# Patient Record
Sex: MALE | Race: WHITE | HISPANIC OR LATINO | Employment: FULL TIME | URBAN - NONMETROPOLITAN AREA
[De-identification: names, ages, dates, MRNs, and addresses within clinical notes are randomized per-mention and may not be internally consistent; named-entity substitution may affect disease eponyms.]

---

## 2019-08-11 ENCOUNTER — OFFICE VISIT (OUTPATIENT)
Dept: URGENT CARE | Facility: PHYSICIAN GROUP | Age: 24
End: 2019-08-11
Payer: COMMERCIAL

## 2019-08-11 VITALS — TEMPERATURE: 98.1 F | HEART RATE: 60 BPM | RESPIRATION RATE: 16 BRPM | WEIGHT: 169 LBS | OXYGEN SATURATION: 99 %

## 2019-08-11 DIAGNOSIS — H10.32 ACUTE BACTERIAL CONJUNCTIVITIS OF LEFT EYE: ICD-10-CM

## 2019-08-11 PROCEDURE — 99204 OFFICE O/P NEW MOD 45 MIN: CPT | Performed by: FAMILY MEDICINE

## 2019-08-11 RX ORDER — OFLOXACIN 3 MG/ML
SOLUTION/ DROPS OPHTHALMIC
Qty: 5 ML | Refills: 1 | Status: SHIPPED | OUTPATIENT
Start: 2019-08-11 | End: 2021-07-12

## 2019-08-11 ASSESSMENT — PAIN SCALES - GENERAL: PAINLEVEL: NO PAIN
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
## 2020-12-01 ENCOUNTER — OFFICE VISIT (OUTPATIENT)
Dept: URGENT CARE | Facility: PHYSICIAN GROUP | Age: 25
End: 2020-12-01
Payer: COMMERCIAL

## 2020-12-01 ENCOUNTER — HOSPITAL ENCOUNTER (OUTPATIENT)
Facility: MEDICAL CENTER | Age: 25
End: 2020-12-01
Attending: PHYSICIAN ASSISTANT
Payer: COMMERCIAL

## 2020-12-01 VITALS
DIASTOLIC BLOOD PRESSURE: 92 MMHG | WEIGHT: 197 LBS | SYSTOLIC BLOOD PRESSURE: 146 MMHG | RESPIRATION RATE: 12 BRPM | HEART RATE: 81 BPM | BODY MASS INDEX: 29.86 KG/M2 | HEIGHT: 68 IN | TEMPERATURE: 99.4 F | OXYGEN SATURATION: 98 %

## 2020-12-01 DIAGNOSIS — J02.9 SORE THROAT: ICD-10-CM

## 2020-12-01 LAB
COVID ORDER STATUS COVID19: NORMAL
INT CON NEG: NORMAL
INT CON POS: NORMAL
S PYO AG THROAT QL: NORMAL

## 2020-12-01 PROCEDURE — 99214 OFFICE O/P EST MOD 30 MIN: CPT | Performed by: PHYSICIAN ASSISTANT

## 2020-12-01 PROCEDURE — 87880 STREP A ASSAY W/OPTIC: CPT | Performed by: PHYSICIAN ASSISTANT

## 2020-12-01 PROCEDURE — U0003 INFECTIOUS AGENT DETECTION BY NUCLEIC ACID (DNA OR RNA); SEVERE ACUTE RESPIRATORY SYNDROME CORONAVIRUS 2 (SARS-COV-2) (CORONAVIRUS DISEASE [COVID-19]), AMPLIFIED PROBE TECHNIQUE, MAKING USE OF HIGH THROUGHPUT TECHNOLOGIES AS DESCRIBED BY CMS-2020-01-R: HCPCS

## 2020-12-01 NOTE — LETTER
December 1, 2020         Patient: Ernesto Khan   YOB: 1995   Date of Visit: 12/1/2020           To Whom it May Concern:    Ernesto Khan was seen in my clinic on 12/1/2020.    Please excuse patient for missing school/work until COVID results are available, typically taking 1-3 days.  They have been advised to quarantine during this time.      If you have any questions or concerns, please don't hesitate to call.        Sincerely,           Mary Ann Marte P.A.-C.  Electronically Signed

## 2020-12-01 NOTE — PROGRESS NOTES
Chief Complaint   Patient presents with   • Sore Throat     x2days    • Nasal Congestion       HISTORY OF PRESENT ILLNESS: Patient is a 25 y.o. female who presents today for the following:    ST x 2-3 days  + nasal congestion, mild HA, mild cough  Denies  SOB, fever, chills, body aches  No known COVID exposure  OTC meds today: none    There are no active problems to display for this patient.      Allergies:Pcn [penicillins]    Current Outpatient Medications Ordered in Epic   Medication Sig Dispense Refill   • ofloxacin (OCUFLOX) 0.3 % Solution 1-2 DROPS TO LEFT EYE EVERY 2-4 HOURS WHILE AWAKE. USE UNTIL BETTER AND FOR ONE EXTRA DAY AFTER BETTER. (Patient not taking: Reported on 12/1/2020) 5 mL 1     No current [facility]-ordered facility-administered medications on file.        History reviewed. No pertinent past medical history.    Social History     Tobacco Use   • Smoking status: Never Smoker   • Smokeless tobacco: Never Used   Substance Use Topics   • Alcohol use: Not on file   • Drug use: Not on file       No family status information on file.   History reviewed. No pertinent family history.    Review of Systems:   Constitutional ROS: No unexpected change in weight, No weakness, No fatigue  Eye ROS: No recent significant change in vision, No eye pain, redness, discharge  Ear ROS: No drainage, No tinnitus or vertigo, No recent change in hearing  Mouth/Throat ROS: No teeth or gum problems, No bleeding gums, No tongue complaints  Neck ROS: No swollen glands, No significant pain in neck  Pulmonary ROS: No chronic cough, sputum, or hemoptysis, No dyspnea on exertion, No wheezing  Cardiovascular ROS: No diaphoresis, No edema, No palpitations  Musculoskeletal/Extremities ROS: No peripheral edema, No pain, redness or swelling on the joints  Hematologic/Lymphatic ROS: No chills, No night sweats, No weight loss  Skin/Integumentary ROS: No edema, No evidence of rash, No itching      Exam:  /92   Pulse 81   Temp 37.4 °C  "(99.4 °F) (Temporal)   Resp 12   Ht 1.727 m (5' 8\")   Wt 89.4 kg (197 lb)   SpO2 98%   General: Well developed, well nourished. No distress.    Eye: PERRL/EOMI; conjunctivae clear, lids normal.  ENMT: Lips without lesions, MMM. Oropharynx is clear. Bilateral TMs are within normal limits.  Pulmonary: Unlabored respiratory effort. Lungs clear to auscultation, no wheezes, no rhonchi.    Cardiovascular: Regular rate and rhythm without murmur.   Neurologic: Grossly nonfocal. No facial asymmetry noted.  Lymph: No cervical lymphadenopathy noted.  Skin: Warm, dry, good turgor. No rashes in visible areas.   Psych: Normal mood. Alert and oriented to person, place and time.    Rapid strep: Negative    Assessment/Plan:  Discussed likely viral etiology.  Vitals and exam are unremarkable.  Low suspicion for pneumonia. Patient will be tested for COVID and has been advised to quarantine until results are available. Discussed appropriate over-the-counter symptomatic medication, and when to return to clinic. Follow up for worsening or persistent symptoms.  1. Sore throat  POCT Rapid Strep A    COVID/SARS COV-2 PCR       "

## 2020-12-02 LAB
SARS-COV-2 RNA RESP QL NAA+PROBE: DETECTED
SPECIMEN SOURCE: ABNORMAL

## 2021-06-18 ENCOUNTER — TELEPHONE (OUTPATIENT)
Dept: SCHEDULING | Facility: IMAGING CENTER | Age: 26
End: 2021-06-18

## 2021-07-12 ENCOUNTER — OFFICE VISIT (OUTPATIENT)
Dept: MEDICAL GROUP | Facility: PHYSICIAN GROUP | Age: 26
End: 2021-07-12
Payer: COMMERCIAL

## 2021-07-12 VITALS
SYSTOLIC BLOOD PRESSURE: 132 MMHG | WEIGHT: 180 LBS | HEART RATE: 64 BPM | TEMPERATURE: 98.2 F | DIASTOLIC BLOOD PRESSURE: 78 MMHG | HEIGHT: 68 IN | OXYGEN SATURATION: 98 % | BODY MASS INDEX: 27.28 KG/M2 | RESPIRATION RATE: 14 BRPM

## 2021-07-12 DIAGNOSIS — Z76.89 ENCOUNTER TO ESTABLISH CARE: ICD-10-CM

## 2021-07-12 DIAGNOSIS — Z13.6 SCREENING FOR CARDIOVASCULAR CONDITION: ICD-10-CM

## 2021-07-12 DIAGNOSIS — N62 GYNECOMASTIA: ICD-10-CM

## 2021-07-12 DIAGNOSIS — N52.9 FAILURE OF ERECTION: ICD-10-CM

## 2021-07-12 PROCEDURE — 99214 OFFICE O/P EST MOD 30 MIN: CPT | Performed by: NURSE PRACTITIONER

## 2021-07-12 RX ORDER — COVID-19 MOLECULAR TEST ASSAY
KIT MISCELLANEOUS
COMMUNITY
Start: 2021-05-14 | End: 2021-07-12

## 2021-07-12 ASSESSMENT — PATIENT HEALTH QUESTIONNAIRE - PHQ9: CLINICAL INTERPRETATION OF PHQ2 SCORE: 0

## 2021-07-12 NOTE — PROGRESS NOTES
No chief complaint on file.      Subjective:     HPI:   Ernesto Khan is a 25 y.o. male here to discuss the evaluation and management of:        Gynecomastia  Patient reports that he has had gynecomastia since he was a child.  He denies any discharge from his nipples, fluctuation in breast size, or breast pain.  Today he is asking about getting his testosterone levels checked.  I do feel this is appropriate with his symptoms of gynecomastia and lack of morning erections.  Orders have been placed.      Failure of erection  Patient reports that this is a new condition.  He states that for several months he has not been able to have a morning erection.  He does have regular sex approximately 3 times a week and is able to ejaculate and have a sustainable erection during sex.  He denies any signs of STD.        ROS:  Gen: no fevers/chills, no changes in weight  Eyes: no changes in vision  ENT: no sore throat, no hearing loss, no bloody nose  Pulm: no sob, no cough  CV: no chest pain, no palpitations  GI: no nausea/vomiting, no diarrhea  : Positive per HPI  MSk: no myalgias  Skin: no rash  Neuro: no headaches, no numbness/tingling  Heme/Lymph: no easy bruising    Allergies   Allergen Reactions   • Pcn [Penicillins] Hives       Current medicines (including changes today)  Current Outpatient Medications   Medication Sig Dispense Refill   • ID NOW COVID-19 Kit TEST AS DIRECTED     • ofloxacin (OCUFLOX) 0.3 % Solution 1-2 DROPS TO LEFT EYE EVERY 2-4 HOURS WHILE AWAKE. USE UNTIL BETTER AND FOR ONE EXTRA DAY AFTER BETTER. (Patient not taking: Reported on 12/1/2020) 5 mL 1     No current facility-administered medications for this visit.       Social History     Tobacco Use   • Smoking status: Never Smoker   • Smokeless tobacco: Never Used   Vaping Use   • Vaping Use: Never used   Substance Use Topics   • Alcohol use: Yes     Comment: OCC   • Drug use: Not Currently       Patient Active Problem List    Diagnosis Date  "Noted   • Gynecomastia 07/12/2021   • Failure of erection 07/12/2021       Family History   Problem Relation Age of Onset   • Hypertension Father    • Diabetes Father    • Diabetes Paternal Grandmother    • Psychiatric Illness Paternal Grandmother    • Diabetes Paternal Grandfather    • Cancer Cousin 14        unsure of type          Objective:     /78   Pulse 64   Temp 36.8 °C (98.2 °F) (Temporal)   Resp 14   Ht 1.715 m (5' 7.5\")   Wt 81.6 kg (180 lb)   SpO2 98%  Body mass index is 27.78 kg/m².    Physical Exam:  Constitutional: Well-developed and well-nourished male in NAD. Not diaphoretic. No distress.   Skin: warm, dry, intact, no evidence of rash or concerning lesions  Head: Atraumatic without lesions.  Eyes: Conjunctivae and extraocular motions are normal. Pupils are equal, round, and reactive to light. No scleral icterus.   Ears:  External ears unremarkable.   Chest: Bilateral enlarged breast tissue.  Neck: Supple, trachea midline. No thyromegaly present. No cervical or supraclavicular lymphadenopathy.  Cardiovascular: Regular rate and rhythm without murmur. Radial pulses are intact and equal bilaterally.  Pulmonary: Clear to ausculation. Normal effort. No rales, ronchi, or wheezing.  Abdomen: Soft, non tender, and without distention. Active bowel sounds in all four quadrants.   Extremities: No cyanosis, clubbing, erythema, nor edema.   Neurological: Alert and oriented x 3. No cranial nerve deficit. 5/5 myotomes. Sensation intact.   Psychiatric:  Behavior, mood, and affect are appropriate.       Assessment and Plan:     The following treatment plan was discussed:    1. Encounter to establish care  Very pleasant 25-year-old male presents today to establish care.  He reports that he has not had a regular primary care provider in the past.  Today he has concerns about enlarged breast tissue, and inability to have morning erections.  He is requesting testosterone levels.  Reviewed care gaps with " patient he does report that he is up-to-date on his immunization [place].  Did encourage him to bring in his vaccination records so that we can update his records here.    2. Gynecomastia  Discussed with patient multiple causes for enlarged breast tissue.  Testosterone levels were ordered and will review them over MyChart and less severe abnormalities are seen.  - TESTOSTERONE SERUM; Future    3. Failure of erection  Testosterone levels ordered.  Did discuss possible referral to urology in future.    4. Screening for cardiovascular condition  - Comp Metabolic Panel; Future  - Lipid Profile; Future    Other orders  - ID NOW COVID-19 Kit; TEST AS DIRECTED      Any change or worsening of signs or symptoms, patient encouraged to follow-up or report to emergency room for further evaluation. Patient verbalizes understanding and agrees.    Follow-Up: Return if symptoms worsen or fail to improve.  Will review labs over MyChart unless severe abnormalities are found.      PLEASE NOTE: This dictation was created using voice recognition software. I have made every reasonable attempt to correct obvious errors, but I expect that there are errors of grammar and possibly content that I did not discover before finalizing the note.

## 2021-07-12 NOTE — ASSESSMENT & PLAN NOTE
Patient reports that he has had gynecomastia since he was a child.  He denies any discharge from his nipples, fluctuation in breast size, or breast pain.  Today he is asking about getting his testosterone levels checked.  I do feel this is appropriate with his symptoms of gynecomastia and lack of morning erections.  Orders have been placed.

## 2021-07-12 NOTE — PATIENT INSTRUCTIONS
Gynecomastia, Adult  Gynecomastia is an overgrowth of gland tissue in a man's breasts. This may cause one or both breasts to become enlarged. This often develops in men who have an imbalance of the male sex hormone (testosterone) and the female sex hormone (estrogen). This means that a man may have too much estrogen, too little testosterone, or both. Gynecomastia may be a normal part of aging for some men. It can also happen to adolescent boys during puberty.  What are the causes?  Gynecomastia may be caused by:  · Certain medicines, such as:  ? Estrogen supplements and medicines that act like estrogen in the body.  ? Medicines that keep testosterone from functioning normally in the body (testosterone-inhibiting drugs).  ? Anabolic steroids.  ? Medicines to treat heartburn, cancer, heart disease, mental health problems, HIV (human immunodeficiency virus) or AIDS (acquired immunodeficiency syndrome).  ? Antibiotic medicine.  ? Chemotherapy medicine.  · Recreational drugs, including alcohol, marijuana, and opioids.  · Herbal products, including lavender and tea tree oil.  · A gene that is passed along from parent to child (inherited).  · Tumors in the pituitary or adrenal gland.  · An overactive thyroid gland.  · Certain inherited disorders, including a genetic disease that causes low testosterone in males (Klinefelter syndrome).  · Cancer of the lung, kidney, liver, testicle, or gastrointestinal tract.  · Conditions that cause liver or kidney failure.  · Poor nutrition and starvation.  · Testicle shrinking or failure (testicular atrophy).  In some cases, the cause may not be known.  What increases the risk?  You may have a higher risk for gynecomastia if you:  · Are 50 years old or older.  · Are overweight.  · Abuse alcohol or other drugs.  · Have a family history of gynecomastia.  What are the signs or symptoms?    Most of the time, breast enlargement is the only symptom. The enlargement may start near the nipple,  and the breast tissue may feel firm and rubbery. The breast may feel itchy, painful or tender.  How is this diagnosed?  This condition may be diagnosed based on:  · Your symptoms.  · Your medical history.  · A physical exam.  · Imaging tests, such as:  ? An ultrasound.  ? A mammogram.  ? An MRI.  · Blood tests.  · Removal of a sample of breast tissue to be tested in a lab (biopsy).  How is this treated?  Gynecomastia may go away on its own, without treatment. If gynecomastia is caused by a medical problem or drug abuse, treatment may include:  · Getting treatment for the underlying medical problem or for drug abuse.  · Changing or stopping medicines.  · Medicines to block the effects of estrogen.  · Taking a testosterone replacement.  · Surgery to remove breast tissue or any lumps in your breasts.  · Breast reduction surgery. This may be a possibility if you have severe or painful gynecomastia.  Follow these instructions at home:  · Take over-the-counter and prescription medicines only as told by your health care provider.  · Talk to your health care provider before taking any herbal medicines or diet supplements.  · Do not abuse drugs or alcohol.  · Keep all follow-up visits as told by your health care provider. This is important.  Contact a health care provider if:  · Your breast tissue grows larger or gets more swollen or painful.  · You have a lump in your testicle.  · You have blood or discharge coming from your nipples.  · Your nipple changes shape.  · You develop a hard or painful lump in your breast.  This information is not intended to replace advice given to you by your health care provider. Make sure you discuss any questions you have with your health care provider.  Document Released: 02/10/2017 Document Revised: 08/10/2017 Document Reviewed: 02/10/2017  Elsevier Patient Education © 2020 [employer]

## 2021-07-12 NOTE — ASSESSMENT & PLAN NOTE
Patient reports that this is a new condition.  He states that for several months he has not been able to have a morning erection.  He does have regular sex approximately 3 times a week and is able to ejaculate and have a sustainable erection during sex.  He denies any signs of STD.

## 2021-07-13 ENCOUNTER — HOSPITAL ENCOUNTER (OUTPATIENT)
Dept: LAB | Facility: MEDICAL CENTER | Age: 26
End: 2021-07-13
Attending: NURSE PRACTITIONER
Payer: COMMERCIAL

## 2021-07-13 DIAGNOSIS — Z13.6 SCREENING FOR CARDIOVASCULAR CONDITION: ICD-10-CM

## 2021-07-13 DIAGNOSIS — N62 GYNECOMASTIA: ICD-10-CM

## 2021-07-13 LAB
ALBUMIN SERPL BCP-MCNC: 4.7 G/DL (ref 3.2–4.9)
ALBUMIN/GLOB SERPL: 1.9 G/DL
ALP SERPL-CCNC: 95 U/L (ref 30–99)
ALT SERPL-CCNC: 31 U/L (ref 2–50)
ANION GAP SERPL CALC-SCNC: 11 MMOL/L (ref 7–16)
AST SERPL-CCNC: 23 U/L (ref 12–45)
BILIRUB SERPL-MCNC: 0.4 MG/DL (ref 0.1–1.5)
BUN SERPL-MCNC: 13 MG/DL (ref 8–22)
CALCIUM SERPL-MCNC: 9.4 MG/DL (ref 8.5–10.5)
CHLORIDE SERPL-SCNC: 105 MMOL/L (ref 96–112)
CHOLEST SERPL-MCNC: 173 MG/DL (ref 100–199)
CO2 SERPL-SCNC: 24 MMOL/L (ref 20–33)
CREAT SERPL-MCNC: 0.99 MG/DL (ref 0.5–1.4)
FASTING STATUS PATIENT QL REPORTED: NORMAL
GLOBULIN SER CALC-MCNC: 2.5 G/DL (ref 1.9–3.5)
GLUCOSE SERPL-MCNC: 101 MG/DL (ref 65–99)
HDLC SERPL-MCNC: 36 MG/DL
LDLC SERPL CALC-MCNC: 123 MG/DL
POTASSIUM SERPL-SCNC: 4.5 MMOL/L (ref 3.6–5.5)
PROT SERPL-MCNC: 7.2 G/DL (ref 6–8.2)
SODIUM SERPL-SCNC: 140 MMOL/L (ref 135–145)
TRIGL SERPL-MCNC: 71 MG/DL (ref 0–149)

## 2021-07-13 PROCEDURE — 83002 ASSAY OF GONADOTROPIN (LH): CPT

## 2021-07-13 PROCEDURE — 84403 ASSAY OF TOTAL TESTOSTERONE: CPT

## 2021-07-13 PROCEDURE — 36415 COLL VENOUS BLD VENIPUNCTURE: CPT

## 2021-07-13 PROCEDURE — 80061 LIPID PANEL: CPT

## 2021-07-13 PROCEDURE — 80053 COMPREHEN METABOLIC PANEL: CPT

## 2021-07-14 LAB
LH SERPL-ACNC: 4.1 IU/L (ref 1.7–8.6)
TESTOST SERPL-MCNC: 342 NG/DL (ref 175–781)

## 2022-08-29 ENCOUNTER — OFFICE VISIT (OUTPATIENT)
Dept: MEDICAL GROUP | Facility: PHYSICIAN GROUP | Age: 27
End: 2022-08-29
Payer: COMMERCIAL

## 2022-08-29 VITALS
HEART RATE: 59 BPM | TEMPERATURE: 98.1 F | WEIGHT: 187 LBS | DIASTOLIC BLOOD PRESSURE: 72 MMHG | OXYGEN SATURATION: 100 % | HEIGHT: 68 IN | RESPIRATION RATE: 16 BRPM | SYSTOLIC BLOOD PRESSURE: 126 MMHG | BODY MASS INDEX: 28.34 KG/M2

## 2022-08-29 DIAGNOSIS — G43.009 MIGRAINE WITHOUT AURA AND WITHOUT STATUS MIGRAINOSUS, NOT INTRACTABLE: ICD-10-CM

## 2022-08-29 DIAGNOSIS — E78.2 MIXED HYPERLIPIDEMIA: ICD-10-CM

## 2022-08-29 PROCEDURE — 99214 OFFICE O/P EST MOD 30 MIN: CPT | Performed by: NURSE PRACTITIONER

## 2022-08-29 RX ORDER — KETOROLAC TROMETHAMINE 10 MG/1
10 TABLET, FILM COATED ORAL
COMMUNITY
Start: 2022-08-17

## 2022-08-29 RX ORDER — SUMATRIPTAN 25 MG/1
25 TABLET, FILM COATED ORAL
Qty: 10 TABLET | Refills: 3 | Status: SHIPPED | OUTPATIENT
Start: 2022-08-29

## 2022-08-29 SDOH — ECONOMIC STABILITY: INCOME INSECURITY: HOW HARD IS IT FOR YOU TO PAY FOR THE VERY BASICS LIKE FOOD, HOUSING, MEDICAL CARE, AND HEATING?: NOT HARD AT ALL

## 2022-08-29 SDOH — HEALTH STABILITY: PHYSICAL HEALTH: ON AVERAGE, HOW MANY DAYS PER WEEK DO YOU ENGAGE IN MODERATE TO STRENUOUS EXERCISE (LIKE A BRISK WALK)?: 4 DAYS

## 2022-08-29 SDOH — ECONOMIC STABILITY: HOUSING INSECURITY
IN THE LAST 12 MONTHS, WAS THERE A TIME WHEN YOU DID NOT HAVE A STEADY PLACE TO SLEEP OR SLEPT IN A SHELTER (INCLUDING NOW)?: NO

## 2022-08-29 SDOH — ECONOMIC STABILITY: FOOD INSECURITY: WITHIN THE PAST 12 MONTHS, THE FOOD YOU BOUGHT JUST DIDN'T LAST AND YOU DIDN'T HAVE MONEY TO GET MORE.: NEVER TRUE

## 2022-08-29 SDOH — ECONOMIC STABILITY: TRANSPORTATION INSECURITY
IN THE PAST 12 MONTHS, HAS THE LACK OF TRANSPORTATION KEPT YOU FROM MEDICAL APPOINTMENTS OR FROM GETTING MEDICATIONS?: NO

## 2022-08-29 SDOH — ECONOMIC STABILITY: HOUSING INSECURITY: IN THE LAST 12 MONTHS, HOW MANY PLACES HAVE YOU LIVED?: 1

## 2022-08-29 SDOH — ECONOMIC STABILITY: FOOD INSECURITY: WITHIN THE PAST 12 MONTHS, YOU WORRIED THAT YOUR FOOD WOULD RUN OUT BEFORE YOU GOT MONEY TO BUY MORE.: NEVER TRUE

## 2022-08-29 SDOH — HEALTH STABILITY: PHYSICAL HEALTH: ON AVERAGE, HOW MANY MINUTES DO YOU ENGAGE IN EXERCISE AT THIS LEVEL?: 60 MIN

## 2022-08-29 SDOH — ECONOMIC STABILITY: TRANSPORTATION INSECURITY
IN THE PAST 12 MONTHS, HAS LACK OF RELIABLE TRANSPORTATION KEPT YOU FROM MEDICAL APPOINTMENTS, MEETINGS, WORK OR FROM GETTING THINGS NEEDED FOR DAILY LIVING?: NO

## 2022-08-29 SDOH — ECONOMIC STABILITY: INCOME INSECURITY: IN THE LAST 12 MONTHS, WAS THERE A TIME WHEN YOU WERE NOT ABLE TO PAY THE MORTGAGE OR RENT ON TIME?: NO

## 2022-08-29 SDOH — ECONOMIC STABILITY: TRANSPORTATION INSECURITY
IN THE PAST 12 MONTHS, HAS LACK OF TRANSPORTATION KEPT YOU FROM MEETINGS, WORK, OR FROM GETTING THINGS NEEDED FOR DAILY LIVING?: NO

## 2022-08-29 SDOH — HEALTH STABILITY: MENTAL HEALTH
STRESS IS WHEN SOMEONE FEELS TENSE, NERVOUS, ANXIOUS, OR CAN'T SLEEP AT NIGHT BECAUSE THEIR MIND IS TROUBLED. HOW STRESSED ARE YOU?: ONLY A LITTLE

## 2022-08-29 ASSESSMENT — SOCIAL DETERMINANTS OF HEALTH (SDOH)
IN A TYPICAL WEEK, HOW MANY TIMES DO YOU TALK ON THE PHONE WITH FAMILY, FRIENDS, OR NEIGHBORS?: TWICE A WEEK
HOW OFTEN DO YOU HAVE A DRINK CONTAINING ALCOHOL: 2-4 TIMES A MONTH
HOW OFTEN DO YOU ATTENT MEETINGS OF THE CLUB OR ORGANIZATION YOU BELONG TO?: PATIENT DECLINED
DO YOU BELONG TO ANY CLUBS OR ORGANIZATIONS SUCH AS CHURCH GROUPS UNIONS, FRATERNAL OR ATHLETIC GROUPS, OR SCHOOL GROUPS?: NO
ARE YOU MARRIED, WIDOWED, DIVORCED, SEPARATED, NEVER MARRIED, OR LIVING WITH A PARTNER?: LIVING WITH PARTNER
HOW OFTEN DO YOU GET TOGETHER WITH FRIENDS OR RELATIVES?: ONCE A WEEK
HOW OFTEN DO YOU ATTEND CHURCH OR RELIGIOUS SERVICES?: NEVER
HOW MANY DRINKS CONTAINING ALCOHOL DO YOU HAVE ON A TYPICAL DAY WHEN YOU ARE DRINKING: 5 OR 6
WITHIN THE PAST 12 MONTHS, YOU WORRIED THAT YOUR FOOD WOULD RUN OUT BEFORE YOU GOT THE MONEY TO BUY MORE: NEVER TRUE
HOW HARD IS IT FOR YOU TO PAY FOR THE VERY BASICS LIKE FOOD, HOUSING, MEDICAL CARE, AND HEATING?: NOT HARD AT ALL
HOW OFTEN DO YOU ATTEND CHURCH OR RELIGIOUS SERVICES?: NEVER
IN A TYPICAL WEEK, HOW MANY TIMES DO YOU TALK ON THE PHONE WITH FAMILY, FRIENDS, OR NEIGHBORS?: TWICE A WEEK
HOW OFTEN DO YOU ATTENT MEETINGS OF THE CLUB OR ORGANIZATION YOU BELONG TO?: PATIENT DECLINED
HOW OFTEN DO YOU HAVE SIX OR MORE DRINKS ON ONE OCCASION: MONTHLY
ARE YOU MARRIED, WIDOWED, DIVORCED, SEPARATED, NEVER MARRIED, OR LIVING WITH A PARTNER?: LIVING WITH PARTNER
HOW OFTEN DO YOU GET TOGETHER WITH FRIENDS OR RELATIVES?: ONCE A WEEK
DO YOU BELONG TO ANY CLUBS OR ORGANIZATIONS SUCH AS CHURCH GROUPS UNIONS, FRATERNAL OR ATHLETIC GROUPS, OR SCHOOL GROUPS?: NO

## 2022-08-29 ASSESSMENT — LIFESTYLE VARIABLES
HOW MANY STANDARD DRINKS CONTAINING ALCOHOL DO YOU HAVE ON A TYPICAL DAY: 5 OR 6
AUDIT-C TOTAL SCORE: 6
HOW OFTEN DO YOU HAVE SIX OR MORE DRINKS ON ONE OCCASION: MONTHLY
SKIP TO QUESTIONS 9-10: 0
HOW OFTEN DO YOU HAVE A DRINK CONTAINING ALCOHOL: 2-4 TIMES A MONTH

## 2022-08-29 ASSESSMENT — PATIENT HEALTH QUESTIONNAIRE - PHQ9: CLINICAL INTERPRETATION OF PHQ2 SCORE: 0

## 2022-08-29 ASSESSMENT — PAIN SCALES - GENERAL: PAINLEVEL: 8=MODERATE-SEVERE PAIN

## 2022-08-29 NOTE — ASSESSMENT & PLAN NOTE
Reviewed labs from last year indicating elevated lipid values.  Patient is due for repeat labs.  Lab orders placed.

## 2022-08-29 NOTE — PATIENT INSTRUCTIONS
over-the-counter magnesium supplement of 100 to 250 mg and start taking daily.  If you start having soft stools or diarrhea go down to every other days.

## 2022-08-29 NOTE — PROGRESS NOTES
"Chief Complaint   Patient presents with    Migraine     2 weeks on and off/ thursday was worst       Subjective:     HPI:   Ernesto Khan is a 26 y.o. male here to discuss the evaluation and management of:      Migraine without aura and without status migrainosus, not intractable  Patient reports he was seen at [place] emergency department approximately 2 weeks ago due to headache that lasted 5 days.  He was treated with IV fluids, migraine cocktail, and Toradol.  He was discharged home in stable condition with a prescription of Toradol and headache was minimal on discharge.    He reports he had another migraine on Thursday and did take a Toradol 10 mg however it did cause drowsiness. Migraine last 6 hours.     Symptoms of migraine include temporal headache that was worse with position changes, sensitivity to light and noise, nausea.  Denies auras.    He does drink a gallon of water per day.    Patient denies any headache or migraine symptoms today.    Mixed hyperlipidemia  Reviewed labs from last year indicating elevated lipid values.  Patient is due for repeat labs.  Lab orders placed.        ROS:  Gen: no fevers/chills, no changes in weight  Pulm: no sob, no cough  CV: no chest pain, no palpitations  MSk: no myalgias  Neuro: no headaches, no numbness/tingling      Allergies   Allergen Reactions    Pcn [Penicillins] Hives       Current medicines (including changes today)  Current Outpatient Medications   Medication Sig Dispense Refill    SUMAtriptan (IMITREX) 25 MG Tab tablet Take 1 Tablet by mouth one time as needed for Migraine for up to 1 dose. 10 Tablet 3    ketorolac (TORADOL) 10 MG Tab Take 10 mg by mouth 1 time a day as needed.       No current facility-administered medications for this visit.          Objective:       /72 (BP Location: Left arm)   Pulse (!) 59   Temp 36.7 °C (98.1 °F) (Temporal)   Resp 16   Ht 1.727 m (5' 8\")   Wt 84.8 kg (187 lb)   SpO2 100%  Body mass index is 28.43 " kg/m².    Physical Exam:  Constitutional: Well-developed and well-nourished male in NAD. Not diaphoretic. No distress.   Skin: warm, dry, intact  Cardiovascular: Regular rate and rhythm without murmur. Radial pulses are intact and equal bilaterally.  Pulmonary: Clear to ausculation. Normal effort. No rales, ronchi, or wheezing.  Extremities: No cyanosis, clubbing, erythema, nor edema.   Neurological: Alert and oriented x 3.   Psychiatric:  Behavior, mood, and affect are appropriate.      Assessment and Plan:     The following treatment plan was discussed:    1. Migraine without aura and without status migrainosus, not intractable  Stable condition.  Patient is currently headache free.  Discussed with patient at length the importance of keeping a headache diary.  Recommended that he only drinks 64 to 80 ounces of water per day as his current gallon of water could be causing electrolyte deficiencies and causing headaches.  Patient may continue using Toradol for headaches.  Trial of Imitrex was sent to pharmacy.  Discussed the importance of staying away from migraine triggers.  Handouts were provided discharge instructions.  - SUMAtriptan (IMITREX) 25 MG Tab tablet; Take 1 Tablet by mouth one time as needed for Migraine for up to 1 dose.  Dispense: 10 Tablet; Refill: 3  - CBC WITH DIFFERENTIAL; Future    2. Mixed hyperlipidemia  Chronic condition.  Patient is due for annual labs.  Lab orders have been placed.  We will review via [employer] unless significant abnormalities are found.  Discussed appropriate diet lifestyle modifications.  - Lipid Profile; Future  - Comp Metabolic Panel; Future    Other orders  - ketorolac (TORADOL) 10 MG Tab; Take 10 mg by mouth 1 time a day as needed.    Any change or worsening of signs or symptoms, patient encouraged to follow-up or report to urgent care or emergency room for further evaluation. Patient verbalizes understanding and agrees.    Follow-Up: Return if symptoms worsen or fail to  improve.      PLEASE NOTE: This dictation was created using voice recognition software. I have made every reasonable attempt to correct obvious errors, but I expect that there are errors of grammar and possibly content that I did not discover before finalizing the note.

## 2022-08-29 NOTE — ASSESSMENT & PLAN NOTE
Patient reports he was seen at [place] emergency department approximately 2 weeks ago due to headache that lasted 5 days.  He was treated with IV fluids, migraine cocktail, and Toradol.  He was discharged home in stable condition with a prescription of Toradol and headache was minimal on discharge.    He reports he had another migraine on Thursday and did take a Toradol 10 mg however it did cause drowsiness. Migraine last 6 hours.     Symptoms of migraine include temporal headache that was worse with position changes, sensitivity to light and noise, nausea.  Denies auras.    He does drink a gallon of water per day.    Patient denies any headache or migraine symptoms today.